# Patient Record
Sex: FEMALE | Race: WHITE | NOT HISPANIC OR LATINO | Employment: OTHER | URBAN - METROPOLITAN AREA
[De-identification: names, ages, dates, MRNs, and addresses within clinical notes are randomized per-mention and may not be internally consistent; named-entity substitution may affect disease eponyms.]

---

## 2023-10-12 PROBLEM — M17.11 ARTHRITIS OF RIGHT KNEE: Status: ACTIVE | Noted: 2023-10-12

## 2023-10-13 ENCOUNTER — OFFICE VISIT (OUTPATIENT)
Dept: ORTHOPEDIC SURGERY | Facility: CLINIC | Age: 63
End: 2023-10-13
Payer: COMMERCIAL

## 2023-10-13 VITALS — BODY MASS INDEX: 27 KG/M2 | WEIGHT: 168 LBS | HEIGHT: 66 IN

## 2023-10-13 DIAGNOSIS — M17.11 ARTHRITIS OF RIGHT KNEE: ICD-10-CM

## 2023-10-13 DIAGNOSIS — M25.561 RIGHT KNEE PAIN, UNSPECIFIED CHRONICITY: Primary | ICD-10-CM

## 2023-10-13 PROCEDURE — 99214 OFFICE O/P EST MOD 30 MIN: CPT | Performed by: FAMILY MEDICINE

## 2023-10-13 PROCEDURE — 1036F TOBACCO NON-USER: CPT | Performed by: FAMILY MEDICINE

## 2023-10-13 RX ORDER — MELOXICAM 15 MG/1
15 TABLET ORAL DAILY
Qty: 30 TABLET | Refills: 11 | Status: CANCELLED | OUTPATIENT
Start: 2023-10-13 | End: 2024-10-12

## 2023-10-13 RX ORDER — MELOXICAM 15 MG/1
15 TABLET ORAL DAILY
Qty: 30 TABLET | Refills: 0 | Status: SHIPPED | OUTPATIENT
Start: 2023-10-13 | End: 2024-10-12

## 2023-10-13 NOTE — PROGRESS NOTES
History of Present Illness   Chief Complaint   Patient presents with    Right Knee - Follow-up       The patient is 62 y.o. female  here for follow-up of right knee pain.  Initial visit with me 2 weeks ago, see previous note for full details.  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  She did experience a popping sensation at one point that seem to aggravate her symptoms.  X-rays at initial visit showed some mild degenerative changes, we did treat with a knee joint injection with Kenalog, she had good improvement in symptoms that lasted around a week after injection with some recurrence of pain over the past week, less so than prior to injection but still bothering her, pain remains over the medial aspect of her knee, exacerbated by walking, stairs, squatting, no new swelling, she has been wearing a knee brace, she has been taking over-the-counter anti-inflammatories for pain control.    No past medical history on file.    Medication Documentation Review Audit    **Prior to Admission medications have not yet been reviewed**         Allergies   Allergen Reactions    Codeine Unknown       Social History     Socioeconomic History    Marital status:      Spouse name: Not on file    Number of children: Not on file    Years of education: Not on file    Highest education level: Not on file   Occupational History    Not on file   Tobacco Use    Smoking status: Not on file    Smokeless tobacco: Not on file   Substance and Sexual Activity    Alcohol use: Not on file    Drug use: Not on file    Sexual activity: Not on file   Other Topics Concern    Not on file   Social History Narrative    Not on file     Social Determinants of Health     Financial Resource Strain: Not on file   Food Insecurity: Not on file   Transportation Needs: Not on file   Physical Activity: Not on file   Stress: Not on file   Social Connections:  Not on file   Intimate Partner Violence: Not on file   Housing Stability: Not on file       No past surgical history on file.       Review of Systems   GENERAL: Negative  GI: Negative  MUSCULOSKELETAL: See HPI  SKIN: Negative  NEURO:  Negative     Physical Exam:    General/Constitutional: well appearing, no distress, appears stated age  HEENT: sclera clear  Respiratory: non labored breathing  Vascular: No edema, swelling or tenderness, except as noted in detailed exam.  Integumentary: No impressive skin lesions present, except as noted in detailed exam.  Neurological:  Alert and oriented   Psychological:  Normal mood and affect.  Musculoskeletal: Normal, except as noted in detailed exam and in HPI.  Normal gait, unassisted    Right knee: Normal appearance, no swelling, no redness warmth, no joint effusion.  There is focal medial joint tenderness to palpation.  No other areas of tenderness to palpation.  Range of motion from 0 to 125 degrees with pain at end range of flexion.  No motor deficits or pain with strength testing.  There is medial discomfort with Ordonez testing.  Negative Lachman, negative posterior drawer.  Stable to varus and valgus stress       Imaging  No new imaging today.     Assessment   1. Right knee pain, unspecified chronicity        2. Arthritis of right knee  MR knee right wo IV contrast    meloxicam (Mobic) 15 mg tablet            Plan  Pelon diagnosis, further work-up and treatment.  Patient does have some mild degenerative changes on x-ray however she has had ongoing pain despite conservative management with recent Kenalog injection, did have acute worsening symptoms with painful popping sensation, focal medial joint line pain with concern for possible medial meniscus pathology contributing, I would like to obtain an MRI for further evaluation of medial meniscus to see if she may benefit from potential arthroscopic intervention.  I did provide her with a prescription for meloxicam, she will  stop all other NSAIDs.  I also provided her with a home exercise rehabilitation program to begin.  Further treatment pending MRI results